# Patient Record
Sex: FEMALE
[De-identification: names, ages, dates, MRNs, and addresses within clinical notes are randomized per-mention and may not be internally consistent; named-entity substitution may affect disease eponyms.]

---

## 2018-05-10 ENCOUNTER — HOSPITAL ENCOUNTER (EMERGENCY)
Dept: HOSPITAL 88 - FSED | Age: 28
Discharge: HOME | End: 2018-05-10
Payer: COMMERCIAL

## 2018-05-10 VITALS — SYSTOLIC BLOOD PRESSURE: 136 MMHG | DIASTOLIC BLOOD PRESSURE: 71 MMHG

## 2018-05-10 VITALS — HEIGHT: 70 IN | BODY MASS INDEX: 33.64 KG/M2 | WEIGHT: 235 LBS

## 2018-05-10 DIAGNOSIS — K80.20: Primary | ICD-10-CM

## 2018-05-10 PROCEDURE — 85025 COMPLETE CBC W/AUTO DIFF WBC: CPT

## 2018-05-10 PROCEDURE — 80076 HEPATIC FUNCTION PANEL: CPT

## 2018-05-10 PROCEDURE — 81025 URINE PREGNANCY TEST: CPT

## 2018-05-10 PROCEDURE — 80053 COMPREHEN METABOLIC PANEL: CPT

## 2018-05-10 PROCEDURE — 76705 ECHO EXAM OF ABDOMEN: CPT

## 2018-05-10 PROCEDURE — 81003 URINALYSIS AUTO W/O SCOPE: CPT

## 2018-05-10 PROCEDURE — 99284 EMERGENCY DEPT VISIT MOD MDM: CPT

## 2018-10-13 ENCOUNTER — HOSPITAL ENCOUNTER (EMERGENCY)
Dept: HOSPITAL 88 - FSED | Age: 28
Discharge: HOME | End: 2018-10-13
Payer: COMMERCIAL

## 2018-10-13 VITALS — BODY MASS INDEX: 33.64 KG/M2 | HEIGHT: 70 IN | WEIGHT: 235 LBS

## 2018-10-13 DIAGNOSIS — S05.8X2A: ICD-10-CM

## 2018-10-13 DIAGNOSIS — T15.02XA: Primary | ICD-10-CM

## 2018-10-13 PROCEDURE — 99283 EMERGENCY DEPT VISIT LOW MDM: CPT

## 2018-10-16 NOTE — XMS REPORT
Continuity of Care Document

                             Created on: 2018



OSMAN OCHOA

External Reference #: [id number]

: 1990

Sex: Female



Demographics







                          Address                   [address] [place] [address]  [id number]

 

                          Home Phone                [phone]

 

                          Preferred Language        Unknown

 

                          Marital Status            Unknown

 

                          Restoration Affiliation     Unknown

 

                          Race                      Unknown

 

                          Ethnic Group              Unknown





Author







                          Author                    [facility]              Interface

 

                          Address                   Unknown

 

                          Phone                     Unavailable



                                                    



Problems

                    





                    Problem                            Status                            Onset Date     

                          Classification                            Date Reported       

                          Comments                            Source                    

 

                    Pain in left knee                                                        2018                    

                                                      [facility]                    

 

                          M25.562 PAIN IN LEFT KNEE                            Active                       

                    2018                                                                        

                                                      [facility]                    

 

                          BACK PAIN/4 MONTHS PREGNANT/BLADDER INFE                            Active        

                    2013                                                         

                                                       [facility]                  

  

 

                    Gallbladder sludge                            Active                                

                          Problem                            2018                

                                                       [facility]                    

 

                                        Body mass index 40.0-44.9, adult(<span [id number]">Confirmed</span>)      

                      Active                                                        Problem

                            2018                                               

                                         [facility]                    

 

                    Effusion, left knee                                                                 

                                                2018                              

                                        [facility]                    



                                                                                
                                                                                
      



Medications

                    





                    Medication                            Details                            Route      

                          Status                            Patient Instructions         

                          Ordering Provider                            Order Date           

                                        Source                    

 

                          amoxicillin 500 mg oral tablet                            500 mg=1 tab, PO, BID, X

 10 day, # 20 tab, 0 Refill(s), Pharmacy: [facility] [id number]            
                                                Active                                      

                                                2018                             

Medical [facility]                    

 

                                        Fluticasone propionate 0.05 MG/ACTUAT Metered Dose Nasal Spray                  

                                        1 spray, NASAL, Daily, # 1 ea, 2 Refill(s), [facility]

 [id number]                                                        Active                 

                                                                            2018          

                                         [facility]                    

 

                          meclizine 25 mg oral tablet                            25 mg=1 tab, PO, TID, PRN for

 dizziness, X 20 day, # 60 tab, 0 Refill(s), Pharmacy[facility] 
[id number]                                                        Active                  

                                                                            2018           

                                         [facility]                    

 

                          naproxen 500 mg oral tablet                            500 mg=1 tab, PO, BID, with

 food, X 14 day, # 30 tab, 2 Refill(s), Pharmacy: [facility] [id number]    
                                                      No Longer Active                  

                                                                            2018           

                                         [facility]                    

 

                          Norco 5/325 oral tablet                            1 tab, PO, TID, PRN, 12 tab, for

 pain, Substitution Allowed, Maintenance, TAB                            PO        

                    Active                                                        [facility]

                            2013                            [facility]       

             

 

                          ondansetron 4 mg oral tablet, disintegrating                            4 mg, 1 tab,

 PO, TID, PRN, Dissolve tab under tongue, 10 tab, Nausea / Vomiting, 
Substitution AllowedDissolve tab under tongue                            PO        

                    Active                                                        [facility]

                            2013                             [facility]       

             

 

                          acetaminophen                            650 mg, Route: PO, Drug form: TAB, ONCE, 

Dosing Weight 83.182, kg, Priority: STAT, Start date: 13 20:42:00, Stop 
date: 13 20:42:00                            PO                            No Longer

 Active                                                        [facility]                 

                          2013                            [facility]                    

 

                          ceftriaxone                            1 gm, Route: IVPB, Drug form: PDR/INJ, ONCE,

 Dosing Weight 83.182, kg, Priority: STAT, Start date: 13 18:40:00, Stop 
date: 13 18:40:00                            IVPB                            No

 Longer Active                                                        [facility]          

                          2013                            [facility]                 

   

 

                          Sodium Chloride 0.9% (Bolus)  mL                            500 mL, Rate: 500

 ml/hr, Infuse over: 1 hr, Route: IV, kg, Total Volume: 500, Priority: STAT, 
Start date: 13 17:37:00, Duration: 1 doses or times, Stop date: 13 
18:36:00, Bolus DoseBolus Dose                            IV                       

                    No Longer Active                                                        [facility]     

                          2013                            [facility]            

        



                                                                                
                                                                                
                                                      



Allergies, Adverse Reactions, Alerts

                    





                    Substance                            Category                            Reaction   

                          Severity                            Reaction type           

                          Status                            Date Reported                     

                          Comments                            Source                    



                                                                



Immunizations

                    





                    Immunization                            Date Given                            Site  

                          Status                            Last Updated             

                          Comments                            Source                    



                                                                        



Results

                    





                    Order Name                            Results                            Value      

                          Reference Range                            Date                

                          Interpretation                            Comments                       

                                        Source                    

 

                          Knee wo contrast MRI                            Knee wo contrast MRI              

                                           

EXAM: Left knee wo contrast MRI



INDICATION:  - M25.562 LT KNEE PAIN



COMPARISON: None.



TECHNIQUE: Multiplanar, multisequence magnetic resonance imaging of the left 
knee was performed without  the administration of intravenous gadolinium 
contrast. 



FINDINGS: 



Intercondylar notch: Anterior cruciate ligament and posterior cruciate ligament 
are intact.



Medial compartment: No meniscal tear or chondral defect is seen. Medial 
collateral ligament is intact.



Lateral compartment: No meniscal tear or chondral defect is seen. Lateral 
collateral ligament complex is intact. Posterolateral corner structures are 
intact.



Patellofemoral compartment: There is mild heterogeneity of the hyaline cartilage
along the patellar apex without focal chondral defect. Medial plica is noted. 
Medial and lateral patellofemoral retinaculum are intact.



Extensor mechanism: Quadriceps and patellar tendons are intact.



Other findings: Small joint effusion is seen. No acute bony fracture or stress-
related marrow edema is noted.



IMPRESSION:

                                        1. No acute bony abnormality of the left knee.

                                        2. No meniscal, cruciate ligament, or collateral ligament tear.

                                        3. Small knee joint effusion.









SL: [id number]



                                                          2018                 

                                                      -

                                        -





Read by:  Obed Ochoa MD

Dictated Date/time:  18 08:13

Electronically Signed by:  Obed Ochoa MD                18 
08:18

FINAL REPORT

                                        [facility]                    

 

                    CHEMISTRY                            Globulin                            4.0 g/dL   

                          2.0 - 4.0                            2013            

                    Normal                                                        [facility]

                    

 

                    CHEMISTRY                            B/C Ratio                            5         

                          6 - 25                            2013                    

                    LOW                                                        [facility]        

            

 

                    CHEMISTRY                            A/G Ratio                            0.8       

                          0.7 - 1.6                            2013               

                    Normal                                                        [facility]

                    

 

                    CHEMISTRY                            AGAP                            13.6 meq/L     

                          10.0 - 20.0                            2013            

                    Normal                                                        [facility]

                    

 

                    CHEMISTRY                            Sodium Lvl                            139 meq/L

                             135 - 145                            2013         

                    Normal                                                        [facility]

                    

 

                    CHEMISTRY                            Potassium Lvl                            3.6 meq/L

                             3.5 - 5.1                            2013         

                    Normal                                                        [facility]

                    

 

                    CHEMISTRY                            Chloride Lvl                            103 meq/L

                             95 - 109                            2013          

                    Normal                                                        [facility]

                    

 

                    CHEMISTRY                            eGFR                            132 mL/min/1.73m2

                                                         2013                  

                          NA                            1Result Comment: The eGFR is calculated using

 the CKD-EPI formula. In most young, healthy individuals the eGFR will be >90 
mL/min/1.73m2. The eGFR declines with age. An eGFR of 60-89 may be normal in 
some populations, particularly the elderly, for whom the CKD-EPI formula has not
been extensively validated. Use of the eGFR is not recommended in the following 
populations:



Individuals with unstable creatinine concentrations, including pregnant patients
and those with serious co-morbid conditions.



Patients with extremes in muscle mass or diet. 



The data above are obtained from the National Kidney Disease Education Program 
(NKDEP) which additionally recommends that when the eGFR is used in patients 
with extremes of body mass index for purposes of drug dosing, the eGFR should be
multiplied by the estimated BMI.                            [facility]           

         

 

                    CHEMISTRY                            AST                            9 unit/L        

                          0 - 37                            2013                    

                    Normal                                                        [facility]     

               

 

                    CHEMISTRY                            BUN                            3 mg/dL         

                          7 - 22                            2013                     

                    LOW                                                        [facility]         

           

 

                    CHEMISTRY                            Bili Total                            0.3 mg/dL

                             0.2 - 1.3                            2013         

                    Normal                                                        [facility]

                    

 

                    CHEMISTRY                            ALT                            14 unit/L       

                          0 - 65                            2013                   

                    Normal                                                        [facility]    

                

 

                    CHEMISTRY                            Creatinine Lvl                            0.6 mg/dL

                             0.5 - 1.4                            2013         

                    Normal                                                        [facility]

                    

 

                    CHEMISTRY                            CO2                            26 meq/L        

                          24 - 32                            2013                   

                    Normal                                                        [facility]    

                

 

                    CHEMISTRY                            Glucose Lvl                            96 mg/dL

                             70 - 99                            2013           

                          Normal                            2Interpretive Data: Adult reference

 range values reflect the clinical guidelines

of the American Diabetes Association.                            [facility]      

              

 

                    CHEMISTRY                            Albumin Lvl                            3.4 g/dL

                             3.5 - 5.0                            2013         

                    LOW                                                        [facility]

                    

 

                    CHEMISTRY                            Calcium Lvl                            8.5 mg/dL

                             8.5 - 10.5                            2013        

                    Normal                                                        [facility]

                    

 

                    CHEMISTRY                            Alk Phos                            80 unit/L  

                           39 - 136                            2013            

                    Normal                                                        [facility]

                    

 

                    CHEMISTRY                            Total Protein                            7.4 g/dL

                             6.4 - 8.4                            2013         

                    Normal                                                        [facility]

                    

 

                    HEMATOLOGY                            Platelet                            208 K/CMM 

                            133 - 450                            2013          

                    Normal                                                        [facility]

                    

 

                    HEMATOLOGY                            MPV                            8.4 fL         

                          7.4 - 10.4                            2013                 

                    Normal                                                        [facility]  

                  

 

                    HEMATOLOGY                            MCHC                            32.8 g/dL     

                          32.0 - 36.0                            2013            

                    Normal                                                        [facility]

                    

 

                    HEMATOLOGY                            RDW                            13.5 %         

                          11.5 - 14.5                            2013                

                    Normal                                                        [facility] 

                   

 

                    HEMATOLOGY                            MCH                            29.4 pg        

                          27.0 - 31.0                            2013               

                    Normal                                                        [facility]

                    

 

                    HEMATOLOGY                            Hct                            40.0 %         

                          36.0 - 48.0                            2013                

                    Normal                                                        [facility] 

                   

 

                    HEMATOLOGY                            MCV                            89.5 fL        

                          81.0 - 99.0                            2013               

                    Normal                                                        [facility]

                    

 

                    HEMATOLOGY                            Hgb                            13.1 g/dL      

                          12.0 - 16.0                            2013             

                    Normal                                                        [facility]

                    

 

                    HEMATOLOGY                            RBC                            4.47 M/CMM     

                          4.20 - 5.40                            2013            

                    Normal                                                        [facility]

                    

 

                    HEMATOLOGY                            WBC                            9.1 K/CMM      

                          3.7 - 10.4                            2013              

                    Normal                                                        [facility]

                    

 

                    HEMATOLOGY                            Basophils #                            0.0 K/CMM

                             0.0 - 0.2                            2013         

                    Normal                                                        [facility]

                    

 

                    HEMATOLOGY                            Monocytes #                            0.4 K/CMM

                             0.0 - 0.8                            2013         

                    Normal                                                        [facility]

                    

 

                    HEMATOLOGY                            Eosinophils #                            0.0 K/CMM

                             0.0 - 0.5                            2013         

                    Normal                                                        [facility]

                    

 

                    HEMATOLOGY                            Segs-Bands #                            8.0 K/CMM

                             1.5 - 8.1                            2013         

                    Normal                                                        [facility]

                    

 

                    HEMATOLOGY                            Lymphocytes #                            0.7 K/CMM

                             1.0 - 5.5                            2013         

                    LOW                                                        [facility]

                    

 

                    HEMATOLOGY                            Basophils                            0.1 %    

                          0.0 - 1.0                            2013             

                    Normal                                                        [facility]

                    

 

                    HEMATOLOGY                            Eosinophils                            0.0 %  

                           0.0 - 4.0                            2013           

                    Normal                                                        [facility]

                    

 

                    HEMATOLOGY                            Lymphocytes                            8.1 %  

                           20.0 - 40.0                            2013         

                    LOW                                                        [facility]

                    

 

                    HEMATOLOGY                            Monocytes                            4.6 %    

                          2.0 - 12.0                            2013            

                    Normal                                                        [facility]

                    

 

                    HEMATOLOGY                            Segs                            87.2 %        

                          45.0 - 75.0                            2013               

                    [facility]    

                

 

                    CHEMISTRY                            U Preg                            Positive 

*ABN*

                          (2013 16:05:00)                                Negative                     

                    2013                            ABN                                       

                                        [facility]                    

 

                    URINALYSIS                            UA Bacteria                            Many /HPF

 

*ABN*

                          (2013 16:05:00)                                None Seen                    

                    2013                            [facility]                    

 

                    URINALYSIS                            UA Mucus                            Many /LPF 



*ABN*

                          (2013 16:05:00)                                None Seen                    

                    2013                            ABN                                      

                                        [facility]                    

 

                    URINALYSIS                            UA Leuk Est                            Moderate

 

*ABN*

                          (2013 16:05:00)                                Negative                     

                    2013                            [facility]                    

 

                    URINALYSIS                            UA Glucose                            Negative

 mg/dL 

*NA*

                          (2013 16:05:00)                                Negative                     

                    2013                            [facility]                    

 

                    URINALYSIS                            UA RBC                            3 /HPF      

                          0 - 2                            2013                   

                    [facility]        

            

 

                    URINALYSIS                            UA WBC                            144 /HPF    

                          0 - 5                            2013                 

                    [facility]      

              

 

                    URINALYSIS                            UA Sq Epi                            Occasional

 /LPF 

*NA*

                    (2013 16:05:00)                                Few                            

2013                            [facility]                    

 

                    URINALYSIS                            UA Nitrite                            Positive

 

*ABN*

                          (2013 16:05:00)                                Negative                     

                    2013                            [facility]                    

 

                    URINALYSIS                            UA Blood                            Negative 

                          (2013 16:05:00)                                Negative                     

                    2013                            [facility]                                    

                                         [facility]                    

 

                    URINALYSIS                            UA Bili                            Negative 

*NA*

                          (2013 16:05:00)                                Negative                     

                    2013                            [facility]                    

 

                    URINALYSIS                            UA Ketones                            80 mg/dL

 

*ABN*

                          (2013 16:05:00)                                Negative                     

                    2013                            [facility]                    

 

                    URINALYSIS                            UA Urobilinogen                            2.0

 mg/dL                             0.1 - 1.0                            2013   

                         [place]                                                        [facility]

                    

 

                    URINALYSIS                            UA pH                            6.0          

                          5.0 - 8.0                            2013                  

                    Normal                                                        [facility]   

                 

 

                    URINALYSIS                            UA Protein                            Negative

 mg/dL 

                          (2013 16:05:00)                                Negative                     

                    2013                            Normal                                    

                                        [facility]                    

 

                    URINALYSIS                            UA Spec Grav                            1.015 

                             <=1.030                            2013           

                    Normal                                                        [facility]

                    

 

                    URINALYSIS                            UA Turbidity                            Slight

 

*ABN*

                          (2013 16:05:00)                                Clear                        

                    2013                            ABN                                          

                                        [facility]                    

 

                    URINALYSIS                            UA Color                            Yellow 

*NA*

                          (2013 16:05:00)                                Yellow                       

                    2013                            NA                                          

                                        [facility]                    

 

                    Microbiology                            Culture: Urine                              

                                                        2013                   

                                                                            [facility]          

          



                                                                                
                                                                                
                                                                                
                                                                                
                                                                                
                                                                                
                                                                                
                                                                                
                                                                                
                                                                                
                                                                                
                                                                                
                                                    



Vital Signs

                    





                    Vital Sign                            Value                            Date         

                          Comments                            Source                    

 

                    Height                            177.8 cm                            2018    

                                                       [facility]                 

   

 

                    Weight                            105.455                             2018    

                                                       [facility]                 

   

 

                    BMI Calculated                            33.36                             2018

                                                         [facility]             

       

 

                    Heart Rate                            66                             2018     

                                                       [facility]                  

  

 

                    Respitory Rate                            15                             2018 

                                                        [facility]              

      

 

                    Temperature Oral (F)                            98.3 F                            2018

                                                         [facility]             

       

 

                    Systolic (mm Hg)                            123                             2018

                                                         [facility]             

       

 

                    Diastolic (mm Hg)                            74                             2018

                                                         [facility]             

       

 

                    Height                            152.4 cm                            2018    

                                                       [facility]                 

   

 

                    Weight                            103.182                             2018    

                                                       [facility]                 

   

 

                    BMI Calculated                            44.43                             2018

                                                         [facility]             

       

 

                    Systolic (mm Hg)                            126                             2018

                                                         [facility]             

       

 

                    Diastolic (mm Hg)                            79                             2018

                                                         [facility]             

       

 

                    Heart Rate                            56                             2018     

                                                       [facility]                  

  

 

                    Temperature Oral (F)                            97.7 F                            2018

                                                         [facility]             

       

 

                    Respitory Rate                            14                             2018 

                                                        [facility]              

      

 

                    Weight                            83.182                             2013     

                                                      [facility]                    

 

                    Height                            177.8 cm                            2013    

                                                      [facility]                    



                                                                                
                                                                                
                                                                                
                                                                                
                                    



Encounters

                    





                    Location                            Location Details                            Encounter

 Type                            Encounter Number                            Reason For

 Visit                            Attending Provider                            ADM Date

                            DC Date                            Status                

                                        Source                    

 

                    [facility]                                                        Emergency       

                          [id number]                                                    

                    GWENDOLYN MOLINA                             2013

                            Active                            [facility]           

         

 

                                                                            Outpatient                  

                    [id number]                                                        AMY [facility]ALLAS

                             2018                                              

                          Active                            [facility]                                                        Outpatient

                            [id number]                                             

                          [facility] [place]                             2018                       

                    02/15/2018                                                         [facility]

                    

 

                          [facility]                                               

                    Outpatient                            [id number]                            

                            Bill Caban                             2018                                                     

                                        [facility]                    

 

                                                                            Outpatient                  

                    [id number]                                                        AMY [facility]

                             2018                                              

                          Active                            [facility]                                                        Outpatient

                            [id number]                                             

                          A[facility] [place]                             2018                                                         [facility]

                    

 

                    [facility]                                                        Phone

 Message                            [phone]                                     

                                                  2018                                                         [facility] Group   

                 



                                                                                
                                                                           



Procedures

                    





                    Procedure                            Code                            Date           

                          Perfomer                            Comments                        

                                        Source                    

 

                    Breast augmentation                            [id number]                             

                                                                                    Medical

 Group                    

 

                    LASIK                            [id number]                                          

                                                                             Medical [facility] 

                   

 

                    Breast augmentation                            [id number]                             

                                                                                   [facility]

                    

 

                    LASIK                            [id number]                                          

                                                                            [facility]

## 2018-10-16 NOTE — XMS REPORT
Summary of Care: 18 - 18

                             Created on: 2127



OSMAN OCHOA

External Reference #: [id number]

: 1990

Sex: Female



Demographics







                          Address                   [address] [place] [address]  [id number]

 

                          Home Phone                [phone]

 

                          Preferred Language        English

 

                          Marital Status            

 

                          Gnosticist Affiliation     Jain

 

                          Race                      White

 

                                        Additional Race(s)  

 

                          Ethnic Group              /Latin





Author







                          Author                    [facility]

 

                          Organization              [facility]

 

                          Address                   Unknown

 

                          Phone                     Unavailable







Encounter





BABIE Plunkett(FIN) [id number] Date(s): 18 - 18

[facility] [address], [address] [place] [address]- [phone]

Discharge Disposition: Home or Self Care

Attending Physician: Trang Otto DO





Vital Signs







 



                           Most recent to            1



                                         oldest [Reference 



                                         Range]: 

 

 



                           Height                    177.8 cm



                                         (18 4:14 PM)

 

 



                           Temperature Oral          98.3 DegF



                           [96.4-99.1 DegF]          (18 4:14 PM)

 

 



                           Blood Pressure            123/74 mmHg



                           [/60-90 mmHg]       (18 4:14 PM)

 

 



                           Respiratory Rate          15 BRMIN



                           [14-20 BRMIN]             (18 4:14 PM)

 

 



                           Peripheral Pulse          66 bpm



                           Rate [ bpm]         (18 4:14 PM)

 

 



                           Weight                    105.455 kg



                                         (18 4:14 PM)

 

 



                           Body Mass Index           33.36 m2



                                         (18 4:14 PM)







Problem List







    



              Condition     Effective Dates     Status       Health Status     Informant

 

    



                           Gallbladder               Active  



                                         sludge(Confirmed)    

 

    



                           Body mass index           Active  



                                         (BMI) 40.0-44.9,    



                                         adult(Confirmed)    







Allergies, Adverse Reactions, Alerts







   



                 Substance       Reaction        Severity        Status

 

   



                           NKDA                      Active







Medications





fluticasone nasal 0.05 mg/inh spray

1 spray, NASAL, Daily, # 1 ea, 2 Refill(s), Pharmacy: [facility] [id number]

Start Date: 18

Stop Date: 18

Status: Ordered



meclizine 25 mg oral tablet

25 mg=1 tab, PO, TID, PRN for dizziness, X 20 day, # 60 tab, 0 Refill(s), Pharma
cy: [facility] [id number]

Start Date: 18

Stop Date: 7/15/18

Status: Ordered



Results





No data available for this section



Immunizations





No data available for this section



Procedures







    



              Procedure     Date         Related Diagnosis     Body Site     Status

 

    



                           Breast augmentation        Completed

 

    



                           LASIK                     Completed







Social History







 



                           Social History Type       Response

 

 



                           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

 

 



                           Alcohol                   Current, Frequency: 1-2 times per month.

 

 



                           Smoking Status            Never smoker; Exposure to Tobacco Smoke None; Cigarette Smoking

 Last 365



                                         Days No; Reg Smoking Cessation Counseling No



                                         entered on: 18







Assessment and Plan





No data available for this section

## 2018-10-16 NOTE — XMS REPORT
Encounter CCD: 2013 to 2013

                             Created on: 2118



JAVI SAVAGELINE

External Reference #: [id number]

: 1990

Sex: Female



Demographics







                          Address                   [address]

[place]  [address]-

 

                          Home Phone                [phone]

 

                          Preferred Language        Unknown

 

                          Marital Status            Single

 

                          Latter-day Affiliation     Religious

 

                          Race                      Other

 

                          Ethnic Group              /Latin





Author







                          Author                    Auto Generated

 

                          Organization              [facility]

 

                          Address                   Unknown

 

                          Phone                     Unavailable







Care Team Providers







                    Care Team Member Name    Role                Phone

 

                    Lalito Padgett    CP                  [phone]







Allergies, Adverse Reactions, Alerts







  



                     Substance           Reaction            Status

 

  



                           NKDA                      Active







Medications







    



              Medication     Instructions     Start Date     End Date     Status

 

    



              ceftriaxone     1 gm, Route: IVPB, Drug form:     2013     Completed





                                         PDR/INJ, ONCE, Dosing Weight   



                                         83.182, kg, Priority: STAT, Start   



                                         date: 13 18:40:00, Stop date:   



                                         13 18:40:00   

 

    



                 Norco 5/325 oral     1 tab, PO, TID, PRN, 12 tab, for     2013      Ordered



                           tablet                    pain, Substitution Allowed,   



                                         Maintenance, TAB   

 

    



                 ondansetron 4 mg     4 mg, 1 tab, PO, TID, PRN, Dissolve     2013

                                         Ordered



                           oral tablet,              tab under tongue, 10 tab, Nausea /   



                           disintegrating            Vomiting, Substitution Allowed   



                                         Dissolve tab under tongue   

 

    



              acetaminophen     650 mg, Route: PO, Drug form: TAB,     2013     

Completed



                                         ONCE, Dosing Weight 83.182, kg,   



                                         Priority: STAT, Start date:   



                                         13 20:42:00, Stop date:   



                                         13 20:42:00   

 

    



                 Sodium Chloride 0.9%     500 mL, Rate: 500 ml/hr, Infuse     2013

                                         Completed



                           (Bolus)  mL         over: 1 hr, Route: IV, kg, Total   



                                         Volume: 500, Priority: STAT, Start   



                                         date: 13 17:37:00, Duration:   



                                         1 doses or times, Stop date:   



                                         13 18:36:00, Bolus Dose   



                                         Bolus Dose   







Vital Signs







                          Most recent to oldest [Reference Range]:    1

 

                          Height                    177.8 cm 

                                        (2013 15:32:00)  

 

                          Weight                    83.182 kg 

                                        (2013 15:32:00)  







Results





URINALYSIS





                          Most recent to oldest [Reference Range]:    1

 

                          UA Turbidity [Clear]      Slight 

*ABN*

                                        (2013 16:05:00)  

 

                          UA Color [Yellow]         Yellow 

*NA*

                                        (2013 16:05:00)  

 

                          UA pH [5.0-8.0]           6.0 

                                        (2013 16:05:00)  

 

                          UA Spec Grav [<=1.030]    1.015 

                                        (2013 16:05:00)  

 

                          UA Glucose [Negative mg/dL]    Negative mg/dL 

*NA*

                                        (2013 16:05:00)  

 

                          UA Blood [Negative]       Negative 

                                        (2013 16:05:00)  

 

                          UA Ketones [Negative mg/dL]    80 mg/dL 

*ABN*

                                        (2013 16:05:00)  

 

                          UA Protein [Negative mg/dL]    Negative mg/dL 

                                        (2013 16:05:00)  

 

                          UA Urobilinogen [0.1-1.0 mg/dL]    2.0 mg/dL 

*HI*

                                        (2013 16:05:00)  

 

                          UA Bili [Negative]        Negative 

*NA*

                                        (2013 16:05:00)  

 

                          UA Leuk Est [Negative]    Moderate 

*ABN*

                                        (2013 16:05:00)  

 

                          UA Nitrite [Negative]     Positive 

*ABN*

                                        (2013 16:05:00)  

 

                          UA WBC [0-5 /HPF]         144 /HPF 

*HI*

                                        (2013 16:05:00)  

 

                          UA RBC [0-2 /HPF]         3 /HPF 

*HI*

                                        (2013 16:05:00)  

 

                          UA Bacteria [None Seen /HPF]    Many /HPF 

*ABN*

                                        (2013 16:05:00)  

 

                          UA Sq Epi [Few /LPF]      Occasional /LPF 

*NA*

                                        (2013 16:05:00)  

 

                          UA Mucus [None Seen /LPF]    Many /LPF 

*ABN*

                                        (2013 16:05:00)  







CHEMISTRY





                          Most recent to oldest [Reference Range]:    1

 

                          Sodium Lvl [135-145 mEq/L]    139 mEq/L 

                                        (2013 17:48:00)  

 

                          Potassium Lvl [3.5-5.1 mEq/L]    3.6 mEq/L 

                                        (2013 17:48:00)  

 

                          Chloride Lvl [ mEq/L]    103 mEq/L 

                                        (2013:48:00)  

 

                          CO2 [24-32 mEq/L]         26 mEq/L 

                                        (2013:48:00)  

 

                          AGAP [10.0-20.0 mEq/L]    13.6 mEq/L 

                                        (2013:48:00)  

 

                          Creatinine Lvl [0.5-1.4 mg/dL]    0.6 mg/dL 

                                        (2013 17:48:00)  

 

                          eGFR                      132 mL/min/1.73m2 1

*NA*

                                        (2013:48:00)  

 

                          BUN [7-22 mg/dL]          3 mg/dL 

*LOW*

                                        (2013:48:00)  

 

                          B/C Ratio [6-25]          5 

*LOW*

                                        (2013:48:00)  

 

                          Glucose Lvl [70-99 mg/dL]    96 mg/dL 2

                                        (2013:48:00)  

 

                          Total Protein [6.4-8.4 g/dL]    7.4 g/dL 

                                        (2013:48:00)  

 

                          Albumin Lvl [3.5-5.0 g/dL]    3.4 g/dL 

*LOW*

                                        (2013:48:00)  

 

                          Globulin [2.0-4.0 g/dL]    4.0 g/dL 

                                        (2013:48:00)  

 

                          A/G Ratio [0.7-1.6]       0.8 

                                        (2013:48:00)  

 

                          Calcium Lvl [8.5-10.5 mg/dL]    8.5 mg/dL 

                                        (2013:48:00)  

 

                          ALT [0-65 unit/L]         14 unit/L 

                                        (2013:48:00)  

 

                          AST [0-37 unit/L]         9 unit/L 

                                        (2013:48:00)  

 

                          Alk Phos [ unit/L]    80 unit/L 

                                        (2013:48:00)  

 

                          Bili Total [0.2-1.3 mg/dL]    0.3 mg/dL 

                                        (2013 17:48:00)  

 

                          U Preg [Negative]         Positive 

*ABN*

                                        (2013 16:05:00)  







1Result Comment: The eGFR is calculated using the CKD-EPI formula. In most 
young, healthy individuals the eGFR will be >90 mL/min/1.73m2. The eGFR declines
 with age. An eGFR of 60-89 may be normal in some populations, particularly the 
elderly, for whom the CKD-EPI formula has not been extensively validated. Use of
 the eGFR is not recommended in the following populations:



Individuals with unstable creatinine concentrations, including pregnant patients
 and those with serious co-morbid conditions.



Patients with extremes in muscle mass or diet. 



The data above are obtained from the National Kidney Disease Education Program (
NKDEP) which additionally recommends that when the eGFR is used in patients with
 extremes of body mass index for purposes of drug dosing, the eGFR should be mul
tiplied by the estimated BMI.



2Interpretive Data: Adult reference range values reflect the clinical guidelines

of the American Diabetes Association.



HEMATOLOGY





                          Most recent to oldest [Reference Range]:    1

 

                          WBC [3.7-10.4 K/CMM]      9.1 K/CMM 

                                        (2013:48:00)  

 

                          RBC [4.20-5.40 M/CMM]     4.47 M/CMM 

                                        (2013:48:00)  

 

                          Hgb [12.0-16.0 g/dL]      13.1 g/dL 

                                        (2013:48:00)  

 

                          Hct [36.0-48.0 %]         40.0 % 

                                        (2013:48:00)  

 

                          MCV [81.0-99.0 fL]        89.5 fL 

                                        (2013:48:00)  

 

                          MCH [27.0-31.0 pg]        29.4 pg 

                                        (2013:48:00)  

 

                          MCHC [32.0-36.0 g/dL]     32.8 g/dL 

                                        (2013:48:00)  

 

                          RDW [11.5-14.5 %]         13.5 % 

                                        (2013:48:00)  

 

                          Platelet [133-450 K/CMM]    208 K/CMM 

                                        (2013 17:48:00)  

 

                          MPV [7.4-10.4 fL]         8.4 fL 

                                        (2013 17:48:00)  

 

                          Segs [45.0-75.0 %]        87.2 % 

*HI*

                                        (2013 17:48:00)  

 

                          Lymphocytes [20.0-40.0 %]    8.1 % 

*LOW*

                                        (2013 17:48:00)  

 

                          Monocytes [2.0-12.0 %]    4.6 % 

                                        (2013 17:48:00)  

 

                          Eosinophils [0.0-4.0 %]    0.0 % 

                                        (2013 17:48:00)  

 

                          Basophils [0.0-1.0 %]     0.1 % 

                                        (2013 17:48:00)  

 

                          Segs-Bands # [1.5-8.1 K/CMM]    8.0 K/CMM 

                                        (2013 17:48:00)  

 

                          Lymphocytes # [1.0-5.5 K/CMM]    0.7 K/CMM 

*LOW*

                                        (2013 17:48:00)  

 

                          Monocytes # [0.0-0.8 K/CMM]    0.4 K/CMM 

                                        (2013 17:48:00)  

 

                          Eosinophils # [0.0-0.5 K/CMM]    0.0 K/CMM 

                                        (2013 17:48:00)  

 

                          Basophils # [0.0-0.2 K/CMM]    0.0 K/CMM 

                                        (2013 17:48:00)  







Microbiology Reports







PROCEDURE:Culture: Urine

                  STATUS:                  In Progress





                BODY SITE:                      COLLECTED DATE/TIME:    2013 16:05:00

 

                SOURCE:         Urine, Clean Catch    FREE TEXT SOURCE:     







***PRELIMINARY REPORTS***



Preliminary Report                               



Holding For Better [facility]



Preliminary Report                               



>100,000 CFU/mL Escherichia coli , Susceptibility To Follow 

<10,000 CFU/mL Skin Elvia

## 2018-10-16 NOTE — XMS REPORT
Summary of Care: 18 - 7/3/18

                             Created on: 03/10/2050



OSMAN OCHOA

External Reference #: [id number]

: 1990

Sex: Female



Demographics







                          Address                   [address] [place] [address]  [address]

 

                          Home Phone                [phone]

 

                          Preferred Language        English

 

                          Marital Status            

 

                          Sabianism Affiliation     Bahai

 

                          Race                      White

 

                                        Additional Race(s)  

 

                          Ethnic Group              /Latin





Author







                          Author                    [facility]

 

                          Organization              [facility]

 

                          Address                   Unknown

 

                          Phone                     Unavailable







Encounter





HQ Encntr_maria t(FIN) [id number] Date(s): 18 - 7/3/18

[facility] [address], [address] [place] [address]- 





Vital Signs





No data available for this section



Problem List







    



              Condition     Effective Dates     Status       Health Status     Informant

 

    



                           Gallbladder               Active  



                                         sludge(Confirmed)    

 

    



                           Body mass index           Active  



                                         (BMI) 40.0-44.9,    



                                         adult(Confirmed)    







Allergies, Adverse Reactions, Alerts







   



                 Substance       Reaction        Severity        Status

 

   



                           NKDA                      Active







Medications





amoxicillin 500 mg oral tablet

500 mg=1 tab, PO, BID, X 10 day, # 20 tab, 0 Refill(s), Pharmacy: [id number] [address]

Start Date: 18

Stop Date: 18

Status: Ordered



Results





No data available for this section



Immunizations





No data available for this section



Procedures







    



              Procedure     Date         Related Diagnosis     Body Site     Status

 

    



                           Breast augmentation        Completed

 

    



                           LASIK                     Completed







Social History







 



                           Social History Type       Response

 

 



                           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

 

 



                           Alcohol                   Current, Frequency: 1-2 times per month.

 

 



                           Smoking Status            Never smoker; Exposure to Tobacco Smoke None; Cigarette Smoking

 Last 365



                                         Days No; Reg Smoking Cessation Counseling No



                                         entered on: 18







Assessment and Plan





No data available for this section

## 2018-10-16 NOTE — XMS REPORT
Summary of Care: 18 - 18

                             Created on: 2044



OSMAN OCHOA

External Reference #: [id number]

: 1990

Sex: Female



Demographics







                          Address                   [address] [place] [address]  [address]

 

                          Home Phone                [phone]

 

                          Preferred Language        English

 

                          Marital Status            

 

                          Anabaptism Affiliation     Mandaen

 

                          Race                      White

 

                                        Additional Race(s)  

 

                          Ethnic Group              Non-





Author







                          Author                    [facility]

 

                          Organization              [facility]

 

                          Address                   Unknown

 

                          Phone                     Unavailable







Encounter





HQ Dimitrios(FIN) [id number] Date(s): 18 - 18

[facility] [address] [place] [address]-     [phone]
[phone]

Encounter Diagnosis

Pain in left knee (Final) - 18

Effusion, left knee (Final) - 

Discharge Disposition: Home or Self Care

Attending Physician: Bill Caban DC

Referring Physician: Bill Caban DC





Vital Signs





No data available for this section



Problem List







    



              Condition     Effective Dates     Status       Health Status     Informant

 

    



                           Body mass index           Active  



                                         (BMI) 40.0-44.9,    



                                         adult(Confirmed)    







Allergies, Adverse Reactions, Alerts







   



                 Substance       Reaction        Severity        Status

 

   



                           NKDA                      Active







Medications





No data available for this section



Results





No data available for this section



Immunizations





No data available for this section



Procedures







    



              Procedure     Date         Related Diagnosis     Body Site     Status

 

    



                           Breast augmentation        Completed

 

    



                           LASIK                     Completed







Social History







 



                           Social History Type       Response

 

 



                           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

 

 



                           Alcohol                   Current, Frequency: 1-2 times per month.

 

 



                           Smoking Status            Never smoker; Exposure to Tobacco Smoke None; Cigarette Smoking

 Last 365



                                         Days No; Reg Smoking Cessation Counseling No



                                         entered on: 18







Assessment and Plan





No data available for this section

## 2018-10-16 NOTE — XMS REPORT
Summary of Care: 18 - 18

                             Created on: 2043



OSMAN OCHOA

External Reference #: [id number]

: 1990

Sex: Female



Demographics







                          Address                   [address] [place] [address]  [address]

 

                          Home Phone                [phone]

 

                          Preferred Language        English

 

                          Marital Status            

 

                          Buddhism Affiliation     Judaism

 

                          Race                      White

 

                                        Additional Race(s)  

 

                          Ethnic Group              Non-





Author







                          Author                    [facility]

 

                          Organization              [facility]

 

                          Address                   Unknown

 

                          Phone                     Unavailable







Encounter





ABBIE Plunkett(FIN) [id number] Date(s): 18 - 18

[facility] [address], [address] [place] [address] [phone]

Discharge Disposition: Home or Self Care

Attending Physician: Trang Otto DO





Vital Signs







 



                           Most recent to            1



                                         oldest [Reference 



                                         Range]: 

 

 



                           Height                    152.4 cm



                                         (18 10:12 AM)

 

 



                           Temperature Oral          97.7 DegF



                           [96.4-99.1 DegF]          (18 10:12 AM)

 

 



                           Blood Pressure            126/79 mmHg



                           [/60-90 mmHg]       (18 10:12 AM)

 

 



                           Respiratory Rate          14 BRMIN



                           [14-20 BRMIN]             (18 10:12 AM)

 

 



                           Peripheral Pulse          56 bpm



                           Rate [ bpm]         *LOW*



                                         (18 10:12 AM)

 

 



                           Weight                    103.182 kg



                                         (18 10:12 AM)

 

 



                           Body Mass Index           44.43 m2



                                         (18 10:12 AM)







Problem List







    



              Condition     Effective Dates     Status       Health Status     Informant

 

    



                           Body mass index           Active  



                                         (BMI) 40.0-44.9,    



                                         adult(Confirmed)    







Allergies, Adverse Reactions, Alerts







   



                 Substance       Reaction        Severity        Status

 

   



                           NKDA                      Active







Medications





naproxen 500 mg oral tablet

500 mg=1 tab, PO, BID, with food, X 14 day, # 30 tab, 2 Refill(s), Pharmacy: [facility] [id number]

Start Date: 18

Stop Date: 3/28/18

Status: Completed



Results





No data available for this section



Immunizations





No data available for this section



Procedures







    



              Procedure     Date         Related Diagnosis     Body Site     Status

 

    



                           Breast augmentation        Completed

 

    



                           LASIK                     Completed







Social History







 



                           Social History Type       Response

 

 



                           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

 

 



                           Alcohol                   Current, Frequency: 1-2 times per month.

 

 



                           Smoking Status            Never smoker; Exposure to Tobacco Smoke None; Cigarette Smoking

 Last 365



                                         Days No; Reg Smoking Cessation Counseling No



                                         entered on: 18







Assessment and Plan





No data available for this section